# Patient Record
(demographics unavailable — no encounter records)

---

## 2024-12-05 NOTE — IMAGING
[de-identified] : On examination of the lumbar spine, patient has mild rotation of the lumbar spine, no focal tenderness when palpating over the lumbar paraspinal muscles, nontender over the sciatic notch, nontender over the midline. Patient has difficulty with flexion of the lumbar spine, no pain with extension of the lumbar spine. Patient has Slightly externally rotation of the right hip. Patient has severe pain to the groin and anterior thigh with flexion of the hip, internal and external rotation. Antalgic gait.  Radiographs of the lumbar spine, 2 views were taken, negative for any acute acute fracture dislocation, ecchymosis noted, mild spondylolisthesis on L4-L5, decreased intervertebral space on L4-5 and L5-S1.  Levoscoliosis noted.  Radiographs of the right hip and pelvis, 2 views, negative for any acute fracture or dislocation, over coverage of the femoral head and decreased joint space on the right and left hip.

## 2024-12-05 NOTE — HISTORY OF PRESENT ILLNESS
[de-identified] : 66-year-old female here for an embolization of pain to the right upper thigh, patient stated this pain started over December 3, 2024, she does not recall any trauma or any injury. Patient states that the pain starts on the outer aspect of her hip and radiates down to her groin and anterior thigh. Patient states that the pain is excruciating and is 10/10 at rest and with activity.

## 2024-12-05 NOTE — DISCUSSION/SUMMARY
[de-identified] : Impression: Right hip osteoarthritis, lumbar stenosis  Plan: Patient was advised for oral prednisone, patient is diabetic, patient was advised to check her blood sugars and contact her medical doctor if the sugars are elevated. Patient was advised for tramadol for breakthrough pain. Patient was advised after taking the prednisone she can continue with Tylenol or over-the-counter NSAIDs. Patient was advised for physical therapy.  Follow-up: 4 weeks for repeat evaluation.

## 2024-12-12 NOTE — PHYSICAL EXAM
[Normal Coordination] : normal coordination [Normal DTR UE/LE] : normal DTR UE/LE  [Normal Sensation] : normal sensation [Normal Mood and Affect] : normal mood and affect [Oriented] : oriented [Able to Communicate] : able to communicate [Well Developed] : well developed [Well Nourished] : well nourished [Flexion] : flexion [Extension] : extension [Right] : right hip [] : patient ambulates without assistive device [FreeTextEntry9] : severe pain with hip flexion [de-identified] : SDIRA ahmadi [de-identified] : Some pain inhibited weakness on strength testing of the right hip. [TWNoteComboBox7] : flexion 25 degrees

## 2024-12-12 NOTE — REASON FOR VISIT
[Initial Visit] : an initial pain management visit [FreeTextEntry2] : lower back and right hip pain

## 2024-12-12 NOTE — HISTORY OF PRESENT ILLNESS
[FreeTextEntry1] : This is a 66-year-old female here to establish care for right hip. She denies any real pain into the groin, but the pain is located on the lateral thigh and worsens with motion of the right hip joint. The pain has been ongoing for a week and came about insidiously. It is severe and makes it difficult for her to perform her ADL, ambulate, sleep through the night and get in and out of the car. She is not able to put a sock on her right foot without being agony. She is complaining of a jabbing sensation that does not dissipate. Patient has trialed several medications such as Prednisone, Tramadol, Advil and Motrin with no relief. She is referred to me by Dr. Hanley for a pain management consultation. MRIs of the right hip and lower back were ordered but not completed as of yet. Upon physical examination, there is no tenderness over the greater trochanter. She complains of severe pain upon hip flexion.

## 2025-01-27 NOTE — HISTORY OF PRESENT ILLNESS
[de-identified] : 66-year-old female presents to me with low back pain.  Right leg pain.  This is resolving.  She has significant relief down the right lower extremity.  She did a little bit of physical therapy would like to do some more.  No loss of bladder or bowel

## 2025-01-27 NOTE — DATA REVIEWED
[FreeTextEntry1] : I reviewed the MRI of the lumbar spine.  This patient has L4-5 spondylolisthesis and some spinal stenosis L3 L5.

## 2025-01-27 NOTE — IMAGING
[de-identified] : TTP midline spine and paraspinal musculature  Strength                                          Hip flexor   Right: 5/5; Left: 5/5                              Knee extensor     Right: 5/5; Left: 5/5                      Ankle dorsiflexion   Right: 5/5; Left: 5/5                   EHL           Right: 5/5; Left: 5/5                                 Ankle plantarflexion       Right: 5/5; Left: 5/5  Sensation L1   Right: 2/2; Left: 2/2 L2   Right: 2/2; Left: 2/2 L3   Right: 2/2; Left: 2/2 L4   Right: 2/2; Left: 2/2 L5   Right: 2/2; Left: 2/2 S1   Right: 2/2; Left: 2/2  Reflexes Patella   Right: 2+; Left 2+ Achilles   Right: 2+; Left 2+ Clonus  Right: absent; L: absent

## 2025-02-11 NOTE — HISTORY OF PRESENT ILLNESS
[de-identified] : right hip pain pain getting into and out of a car  NAD Right hip:  no skin breakdown FF 0-110 ER 40 IR 20 positive impingement ttp hip ttp greater troch NVI comp soft and nt  Xray right hip:  mri right hip IMPRESSION: Moderate osteoarthritis of the right hip, with partial to focal full-thickness cartilaginous defects, and circumferential acetabular osteophytosis. Mild subchondral bone marrow edema is noted at the superior acetabulum. Large right hip joint effusion with reactive synovitis, and edema in the gluteus minimus muscle bordering the superior joint capsule. Adductor insertional calcific tendinosis near the ischial tuberosity. Low-grade partial tear of the hamstring origin. Mild insertional gluteus medius tendinosis and trace greater trochanteric bursitis.  Plan: went over findings explained the imaging tx options explained  pt script if pain gets worse, will go to PM for possible injections

## 2025-06-24 NOTE — PHYSICAL EXAM
[de-identified] : Lumbar Spine Exam:  Alignment: No spine misalignment, scoliosis, or Kyphosis Elevated right rib hump with forward bending: (-) Elevated left rib hump with forward bending: (-) RT/LT scapula winging (-)   Lumbar ROM: Limited ROM and pain with Extension   Strength: Hip Flexion: R (5/5) L (5/5) Knee extension: R (5/5) L (5/5) Knee flexion: R (5/5) L (5/5) Ankle Dorsiflexion: R (5/5) L(5/5) EHL: R (5/5) L (5/5) Ankle Plantarflexion: R (5/5) L (5/5)   Special Tests: - Positive Slump test- + LT side  - Negative Facet loading/Extension Rotation Test B/l - SIDRA test: Neg b.l   Neurologic: Light touch intact throughout LE Reflexes normal and symmetric   Gait: Normal gait, stable, walks without assistance. Normal toe and heel walking. No signs of foot drop, Drag and slap test (-)

## 2025-06-24 NOTE — DISCUSSION/SUMMARY
[de-identified] : A discussion regarding available pain management treatment options occurred with the patient. These included interventional, rehabilitative, pharmacological, and alternative modalities. We will proceed with the following:   Interventional/ Procedures: 1. Discussed L4-5, L5-S1 TFESI, she defers, she would like to try PO medicine first     Medication/pharmacological: 1. Ordered Methylprednisolone 21-tablet, 6-day taper pack. - I advised that the steroid should be taken with food. In addition, while taking the prescribed steroid, no over the counter or other NSAIDs should be used, such as ibuprofen (Motrin or Advil) or naproxen (Aleve) as this can cause stomach upset or other side effects. If needed for fever or breakthrough pain Tylenol can be used. 2. Ordered Gabapentin 300 mg BID - Sent 30 days supply - Potential side effects were discussed which included dizziness, sedation, and pedal edema to name a few. If the patient cannot tolerate these side effects should they occur, then they will stop the medication. If the patient experiences sedation, they understand that they should not drive. The usage of the medication was discussed and the patient understands that it is an anti-epileptic medication used for neuropathic pain and that the pain relief from this medication will likely be subtle, and that hopefully in combination with the other treatments we are offering we will be able to get some additive relief.    Rehabilitative/alternative modalities: 1. Initiate physician directed activity and lifestyle modifications. 2. Participation in active HEP was discussed and printed. 3. Patient was advised to stay away from any heavy lifting. If needed, they were advised to squat and not bend forward. 4. PT Rx was given to the pt today for lower back pain.   Total clinician time spent today on the patient is 30 minutes including preparing to see the patient, obtaining and/or reviewing and confirming history, performing medically necessary and appropriate examination, counseling and educating the patient and/or family, documenting clinical information in the EHR and communicating and/or referring to other healthcare professionals.  f/u in 2-3 weeks for re-evaluation   RACHELLE Ryan DO

## 2025-06-24 NOTE — DISCUSSION/SUMMARY
[de-identified] : A discussion regarding available pain management treatment options occurred with the patient. These included interventional, rehabilitative, pharmacological, and alternative modalities. We will proceed with the following:   Interventional/ Procedures: 1. Discussed L4-5, L5-S1 TFESI, she defers, she would like to try PO medicine first     Medication/pharmacological: 1. Ordered Methylprednisolone 21-tablet, 6-day taper pack. - I advised that the steroid should be taken with food. In addition, while taking the prescribed steroid, no over the counter or other NSAIDs should be used, such as ibuprofen (Motrin or Advil) or naproxen (Aleve) as this can cause stomach upset or other side effects. If needed for fever or breakthrough pain Tylenol can be used. 2. Ordered Gabapentin 300 mg BID - Sent 30 days supply - Potential side effects were discussed which included dizziness, sedation, and pedal edema to name a few. If the patient cannot tolerate these side effects should they occur, then they will stop the medication. If the patient experiences sedation, they understand that they should not drive. The usage of the medication was discussed and the patient understands that it is an anti-epileptic medication used for neuropathic pain and that the pain relief from this medication will likely be subtle, and that hopefully in combination with the other treatments we are offering we will be able to get some additive relief.    Rehabilitative/alternative modalities: 1. Initiate physician directed activity and lifestyle modifications. 2. Participation in active HEP was discussed and printed. 3. Patient was advised to stay away from any heavy lifting. If needed, they were advised to squat and not bend forward. 4. PT Rx was given to the pt today for lower back pain.   Total clinician time spent today on the patient is 30 minutes including preparing to see the patient, obtaining and/or reviewing and confirming history, performing medically necessary and appropriate examination, counseling and educating the patient and/or family, documenting clinical information in the EHR and communicating and/or referring to other healthcare professionals.  f/u in 2-3 weeks for re-evaluation   RACHELLE Ryan DO

## 2025-06-24 NOTE — PHYSICAL EXAM
[de-identified] : Lumbar Spine Exam:  Alignment: No spine misalignment, scoliosis, or Kyphosis Elevated right rib hump with forward bending: (-) Elevated left rib hump with forward bending: (-) RT/LT scapula winging (-)   Lumbar ROM: Limited ROM and pain with Extension   Strength: Hip Flexion: R (5/5) L (5/5) Knee extension: R (5/5) L (5/5) Knee flexion: R (5/5) L (5/5) Ankle Dorsiflexion: R (5/5) L(5/5) EHL: R (5/5) L (5/5) Ankle Plantarflexion: R (5/5) L (5/5)   Special Tests: - Positive Slump test- + LT side  - Negative Facet loading/Extension Rotation Test B/l - SIDRA test: Neg b.l   Neurologic: Light touch intact throughout LE Reflexes normal and symmetric   Gait: Normal gait, stable, walks without assistance. Normal toe and heel walking. No signs of foot drop, Drag and slap test (-)

## 2025-06-24 NOTE — ASSESSMENT
[FreeTextEntry1] : A discussion regarding available pain management treatment options occurred with the patient. These included interventional, rehabilitative, pharmacological, and alternative modalities. We will proceed with the following:  Interventional treatment options:  -Potential treatment options such as further conservative therapy, injections, and surgery were briefly discussed.    Rehabilitative options:    Medication based treatment options:   Complementary treatment options:  - Patient was advised to stay away from any heavy lifting. If needed, she was advised to squat and not bend forward.  - Initiate physician directed activity and lifestyle modifications.   I, Elisa Paz, attest that this documentation has been prepared under the direction and in the presence of Provider Sonido Cristina, DO  The documentation recorded by the scribe, in my presence, accurately reflects the service I personally performed, and the decisions made by me with my edits as appropriate.   Best Regards,   Sonido Cristina D.O.

## 2025-06-24 NOTE — HISTORY OF PRESENT ILLNESS
[FreeTextEntry1] : Patient is a 66-year-old female who is here today accompanied by family member, she is here to establish care for left lower back pain with radicular feature going down to her left leg that started 5 days ago with no preceding events.  Patient denies any history of lower back pain.  Patient states that 5 days ago she woke up with severe pain in her left leg associated with burning sensation, stabbing, nerve pain.  Pain is 8-9 out of 10 and her pain limits her ADLs. Pt's pain is associated with numbness, tingling and sharp shooting pain. Pain is worse with sitting down, sleeping at night time as well as coughing/sneezing; better with standing and walking around. Pt denies bowel/bladder incontinence, saddle anesthesia, weakness, falls, or unsteadiness.   she has trailed Tylenol and Aleve with no relief.   she has DM and as per pt it is well controlled, last A1c is 5.4  Denies blood thinners.

## 2025-07-08 NOTE — HISTORY OF PRESENT ILLNESS
[FreeTextEntry1] : Patient is a 66-year-old female who is here today accompanied by family member, she is here to establish care for left lower back pain with radicular feature going down to her left leg that started 5 days ago with no preceding events.  Patient denies any history of lower back pain.  Patient states that 5 days ago she woke up with severe pain in her left leg associated with burning sensation, stabbing, nerve pain.  Pain is 8-9 out of 10 and her pain limits her ADLs. Pt's pain is associated with numbness, tingling and sharp shooting pain. Pain is worse with sitting down, sleeping at night time as well as coughing/sneezing; better with standing and walking around. Pt denies bowel/bladder incontinence, saddle anesthesia, weakness, falls, or unsteadiness.   she has trailed Tylenol and Aleve with no relief.   she has DM and as per pt it is well controlled, last A1c is 5.4  Denies blood thinners.   07/08/2025 REVISIT ENCOUNTER Since her last visit, she is still experiencing a burning experience radiating down the side of her left leg from her low back. She feels tingling in her toes. She finds that her pain has improved because she is able to sleep through the night. Pain can be severe.

## 2025-07-08 NOTE — DISCUSSION/SUMMARY
[de-identified] : A discussion regarding available pain management treatment options occurred with the patient. These included interventional, rehabilitative, pharmacological, and alternative modalities. We will proceed with the following:   Interventional/ Procedures: 1. Discussed L4-5, L5-S1 TFESI, she defers, she would like to try PO medicine first     Medication/pharmacological: 1. Ordered Diclofenac 75mg 1tablet 2x daily (30day Supply)  I advised FRAN that the NSAID should be taken with food.  In addition while taking the prescribed NSAID, no over the counter or other NSAIDs should be used, such as ibuprofen (Motrin or Advil) or naproxen (Aleve) as this can cause stomach upset or other side effects.  If needed for fever or breakthrough pain Tylenol can be used.   Rehabilitative/alternative modalities: 1. Initiate physician directed activity and lifestyle modifications. Apply ice 2. Participation in active HEP was discussed and printed. 3. Patient was advised to stay away from any heavy lifting. If needed, they were advised to squat and not bend forward. Follow up 4-6 weeks    I, Mode Baldwin, attest that this documentation has been prepared under the direction and in the presence of provider Dr. Sonido Cristina. The documentation recorded by the scribe in my presence, accurately reflects the service I personally performed, and the decisions made by me with my edits as appropriate.   Sonido Cristina, DO

## 2025-07-08 NOTE — PHYSICAL EXAM
[de-identified] : Lumbar Spine Exam:   Strength: Hip Flexion: R (5/5) L (5/5) Knee extension: R (5/5) L (5/5) Knee flexion: R (5/5) L (5/5) Ankle Dorsiflexion: R (5/5) L(5/5) EHL: R (5/5) L (5/5) Ankle Plantarflexion: R (5/5) L (5/5)   Special Tests: - Positive Slump test- + LT side  - Negative Facet loading/Extension Rotation Test B/l - SIDRA test: Neg b.l   Neurologic: Light touch intact throughout LE Reflexes normal and symmetric   Gait: Normal gait, stable, walks without assistance. Normal toe and heel walking. No signs of foot drop, Drag and slap test (-)